# Patient Record
Sex: FEMALE | Race: WHITE | NOT HISPANIC OR LATINO | URBAN - METROPOLITAN AREA
[De-identification: names, ages, dates, MRNs, and addresses within clinical notes are randomized per-mention and may not be internally consistent; named-entity substitution may affect disease eponyms.]

---

## 2020-07-30 ENCOUNTER — OFFICE VISIT (OUTPATIENT)
Dept: URBAN - METROPOLITAN AREA CLINIC 70 | Facility: CLINIC | Age: 50
End: 2020-07-30

## 2020-09-02 ENCOUNTER — OFFICE VISIT (OUTPATIENT)
Dept: URBAN - METROPOLITAN AREA CLINIC 70 | Facility: CLINIC | Age: 50
End: 2020-09-02

## 2020-09-02 ENCOUNTER — LAB OUTSIDE AN ENCOUNTER (OUTPATIENT)
Dept: URBAN - METROPOLITAN AREA CLINIC 70 | Facility: CLINIC | Age: 50
End: 2020-09-02

## 2020-09-02 ENCOUNTER — OFFICE VISIT (OUTPATIENT)
Dept: URBAN - METROPOLITAN AREA CLINIC 70 | Facility: CLINIC | Age: 50
End: 2020-09-02
Payer: COMMERCIAL

## 2020-09-02 DIAGNOSIS — R74.8 ELEVATED LIVER ENZYMES: ICD-10-CM

## 2020-09-02 DIAGNOSIS — K70.10 ALCOHOLIC HEPATITIS, UNSPECIFIED WHETHER ASCITES PRESENT: ICD-10-CM

## 2020-09-02 PROCEDURE — 80074 ACUTE HEPATITIS PANEL: CPT | Performed by: INTERNAL MEDICINE

## 2020-09-02 PROCEDURE — G8427 DOCREV CUR MEDS BY ELIG CLIN: HCPCS | Performed by: INTERNAL MEDICINE

## 2020-09-02 PROCEDURE — G8417 CALC BMI ABV UP PARAM F/U: HCPCS | Performed by: INTERNAL MEDICINE

## 2020-09-02 PROCEDURE — 3017F COLORECTAL CA SCREEN DOC REV: CPT | Performed by: INTERNAL MEDICINE

## 2020-09-02 PROCEDURE — 1036F TOBACCO NON-USER: CPT | Performed by: INTERNAL MEDICINE

## 2020-09-02 PROCEDURE — 99204 OFFICE O/P NEW MOD 45 MIN: CPT | Performed by: INTERNAL MEDICINE

## 2020-09-02 RX ORDER — ZOLPIDEM TARTRATE 10 MG/1
TABLET, FILM COATED ORAL
Qty: 0 | Refills: 0 | Status: ACTIVE | COMMUNITY
Start: 2017-12-05

## 2020-09-02 RX ORDER — TRIAMTERENE AND HYDROCHLOROTHIAZIDE 37.5; 25 MG/1; MG/1
1 TABLET IN THE MORNING TABLET ORAL ONCE A DAY
Status: ACTIVE | COMMUNITY

## 2020-09-02 RX ORDER — FLUTICASONE PROPIONATE 50 UG/1
SPRAY, METERED NASAL
Qty: 0 | Refills: 0 | Status: ACTIVE | COMMUNITY
Start: 1900-01-01

## 2020-09-02 NOTE — HPI-TODAY'S VISIT:
50 year old female with a history of seasonal allergies, prior history of lap band surgery due to obesity, and hepatic steatosis, presents for evaluation of elevated liver enzymes. She reports she underwent lab draw at [facility], and was informed of significant elevation in liver enzymes. These results have been requested and are pending. She reports mild epigastric discomfort which she attributes to lap band displacement 1/10 in intensity, but no RUQ pain, nausea or vomiting. With regard to her history, she does report heavy alcohol use, and consumes 4-5 beers nightly, with a prior history of up to 8 beers daily, does report binge alcohol consumption this weekend, and reports alcohol consumption over 15 years. Denies tobacco, or illicit drug use. Positive for tattoos, no report of blood transfusion before 1992. Prior CT scan noted in 2017 does report hepatic steatosis without overt stigmata of cirrhosis. Reports a remote history of EGD and colonoscopy, no reported FHx of liver disease, no family history of CRC or IBD reported. 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 She presents for further evaluation of above.

## 2020-10-06 LAB
A/G RATIO: 2.1
AAT, DNA ANALYSIS: (no result)
ACTIN (SMOOTH MUSCLE) ANTIBODY: 5
ADDITIONAL INFORMATION:: (no result)
ALBUMIN: 4.2
ALKALINE PHOSPHATASE: 73
ALT (SGPT): 111
ANA DIRECT: POSITIVE
ANTI-MITOCHONDRIAL AB BY IFA: (no result)
AST (SGOT): 68
BASO (ABSOLUTE): 0
BASOS: 0
BILIRUBIN, TOTAL: 0.6
BUN/CREATININE RATIO: 20
BUN: 16
CALCIUM: 9.1
CARBON DIOXIDE, TOTAL: 22
CERULOPLASMIN: 27.5
CHLORIDE: 103
CREATININE: 0.79
EGFR IF AFRICN AM: 100
EGFR IF NONAFRICN AM: 86
EOS (ABSOLUTE): 0.2
EOS: 3
FERRITIN, SERUM: 451
GLOBULIN, TOTAL: 2
GLUCOSE: 109
HBSAG SCREEN: NEGATIVE
HEMATOCRIT: 39.7
HEMATOLOGY COMMENTS:: (no result)
HEMOGLOBIN: 13.4
HEP A AB, IGM: NEGATIVE
HEP B CORE AB, IGM: NEGATIVE
HEP C VIRUS AB: <0.1
IMMATURE CELLS: (no result)
IMMATURE GRANS (ABS): 0
IMMATURE GRANULOCYTES: 0
INR: 1
LYMPHS (ABSOLUTE): 1.8
LYMPHS: 24
Lab: (no result)
MCH: 32.1
MCHC: 33.8
MCV: 95
MONOCYTES(ABSOLUTE): 0.5
MONOCYTES: 7
NEUTROPHILS (ABSOLUTE): 5
NEUTROPHILS: 66
NRBC: (no result)
PLATELETS: 180
POTASSIUM: 4.1
PROTEIN, TOTAL: 6.2
PROTHROMBIN TIME: 10.4
RBC: 4.17
RDW: 12.1
SODIUM: 139
WBC: 7.6

## 2020-10-09 ENCOUNTER — OFFICE VISIT (OUTPATIENT)
Dept: URBAN - METROPOLITAN AREA CLINIC 88 | Facility: CLINIC | Age: 50
End: 2020-10-09

## 2020-11-12 ENCOUNTER — OFFICE VISIT (OUTPATIENT)
Dept: URBAN - METROPOLITAN AREA CLINIC 70 | Facility: CLINIC | Age: 50
End: 2020-11-12
Payer: COMMERCIAL

## 2020-11-12 ENCOUNTER — WEB ENCOUNTER (OUTPATIENT)
Dept: URBAN - METROPOLITAN AREA CLINIC 70 | Facility: CLINIC | Age: 50
End: 2020-11-12

## 2020-11-12 ENCOUNTER — LAB OUTSIDE AN ENCOUNTER (OUTPATIENT)
Dept: URBAN - METROPOLITAN AREA CLINIC 70 | Facility: CLINIC | Age: 50
End: 2020-11-12

## 2020-11-12 DIAGNOSIS — R74.8 ELEVATED LIVER ENZYMES: ICD-10-CM

## 2020-11-12 DIAGNOSIS — K57.92 DIVERTICULITIS: ICD-10-CM

## 2020-11-12 DIAGNOSIS — K70.10 ALCOHOLIC HEPATITIS, UNSPECIFIED WHETHER ASCITES PRESENT: ICD-10-CM

## 2020-11-12 DIAGNOSIS — R10.32 LLQ PAIN: ICD-10-CM

## 2020-11-12 PROBLEM — 307496006: Status: ACTIVE | Noted: 2020-11-12

## 2020-11-12 PROCEDURE — G8427 DOCREV CUR MEDS BY ELIG CLIN: HCPCS | Performed by: INTERNAL MEDICINE

## 2020-11-12 PROCEDURE — 99214 OFFICE O/P EST MOD 30 MIN: CPT | Performed by: INTERNAL MEDICINE

## 2020-11-12 PROCEDURE — 1036F TOBACCO NON-USER: CPT | Performed by: INTERNAL MEDICINE

## 2020-11-12 PROCEDURE — G8417 CALC BMI ABV UP PARAM F/U: HCPCS | Performed by: INTERNAL MEDICINE

## 2020-11-12 PROCEDURE — 3017F COLORECTAL CA SCREEN DOC REV: CPT | Performed by: INTERNAL MEDICINE

## 2020-11-12 RX ORDER — ZOLPIDEM TARTRATE 10 MG/1
TABLET, FILM COATED ORAL
Qty: 0 | Refills: 0 | Status: ACTIVE | COMMUNITY
Start: 2017-12-05

## 2020-11-12 RX ORDER — TRIAMTERENE AND HYDROCHLOROTHIAZIDE 37.5; 25 MG/1; MG/1
1 TABLET IN THE MORNING TABLET ORAL ONCE A DAY
Status: ACTIVE | COMMUNITY

## 2020-11-12 RX ORDER — FLUTICASONE PROPIONATE 50 UG/1
SPRAY, METERED NASAL
Qty: 0 | Refills: 0 | Status: ACTIVE | COMMUNITY
Start: 1900-01-01

## 2020-11-12 NOTE — HPI-TODAY'S VISIT:
50 year old female with a history of seasonal allergies, prior history of lap band surgery due to obesity, and hepatic steatosis, presents for evaluation of elevated liver enzymes. She reports she underwent lab draw at [facility], and was informed of significant elevation in liver enzymes. These results have been requested and are pending. She reports mild epigastric discomfort which she attributes to lap band displacement 1/10 in intensity, but no RUQ pain, nausea or vomiting. With regard to her history, she does report heavy alcohol use, and consumes 4-5 beers nightly, with a prior history of up to 8 beers daily, does report binge alcohol consumption this weekend, and reports alcohol consumption over 15 years. Denies tobacco, or illicit drug use. Positive for tattoos, no report of blood transfusion before 1992. Prior CT scan noted in 2017 does report hepatic steatosis without overt stigmata of cirrhosis. Reports a remote history of EGD and colonoscopy, no reported FHx of liver disease, no family history of CRC or IBD reported. 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 Lab data obtained 9/8 revealed normal hepatitis panel, positive FARHAT however remainder of liver-2 panel was negative.  ALT 68 t-bili 0.6 alk phos 73, Hb 13.2. INR 1.0. US RUQ revealed hepatic steatosis, no evidence of cirrhosis, normal CBD, normal gallbladder. Today on F/U she reports LLQ and L middle quadrant pain and tenderness, as well as general malaise. No fever, no respiratory symptoms, no vomiting. Does report rare irregularity with stools, no anali constipation, no diarrhea, no rectal bleeding.

## 2020-11-13 ENCOUNTER — TELEPHONE ENCOUNTER (OUTPATIENT)
Dept: URBAN - METROPOLITAN AREA CLINIC 92 | Facility: CLINIC | Age: 50
End: 2020-11-13

## 2020-11-13 PROBLEM — 10743008: Status: ACTIVE | Noted: 2020-11-13

## 2020-11-13 RX ORDER — TRIAMTERENE AND HYDROCHLOROTHIAZIDE 37.5; 25 MG/1; MG/1
1 TABLET IN THE MORNING TABLET ORAL ONCE A DAY
Status: ACTIVE | COMMUNITY

## 2020-11-13 RX ORDER — DICYCLOMINE HYDROCHLORIDE 10 MG/1
1 TABLET CAPSULE ORAL
Qty: 30 | Refills: 1 | OUTPATIENT
Start: 2020-11-13 | End: 2020-12-03

## 2020-11-13 RX ORDER — ZOLPIDEM TARTRATE 10 MG/1
TABLET, FILM COATED ORAL
Qty: 0 | Refills: 0 | Status: ACTIVE | COMMUNITY
Start: 2017-12-05

## 2020-11-13 RX ORDER — FLUTICASONE PROPIONATE 50 UG/1
SPRAY, METERED NASAL
Qty: 0 | Refills: 0 | Status: ACTIVE | COMMUNITY
Start: 1900-01-01

## 2020-11-19 ENCOUNTER — LAB OUTSIDE AN ENCOUNTER (OUTPATIENT)
Dept: URBAN - METROPOLITAN AREA CLINIC 70 | Facility: CLINIC | Age: 50
End: 2020-11-19

## 2020-11-19 ENCOUNTER — CLAIMS CREATED FROM THE CLAIM WINDOW (OUTPATIENT)
Dept: URBAN - METROPOLITAN AREA CLINIC 70 | Facility: CLINIC | Age: 50
End: 2020-11-19
Payer: COMMERCIAL

## 2020-11-19 DIAGNOSIS — R10.12 LUQ ABDOMINAL PAIN: ICD-10-CM

## 2020-11-19 DIAGNOSIS — R74.01 TRANSAMINITIS: ICD-10-CM

## 2020-11-19 DIAGNOSIS — Z12.11 SCREENING COLONOSCOPY: ICD-10-CM

## 2020-11-19 DIAGNOSIS — K76.0 FATTY LIVER: ICD-10-CM

## 2020-11-19 PROBLEM — 301715003 LEFT UPPER QUADRANT PAIN: Status: ACTIVE | Noted: 2020-11-19

## 2020-11-19 PROBLEM — 444783004 SCREENING COLONOSCOPY: Status: ACTIVE | Noted: 2020-11-19

## 2020-11-19 PROCEDURE — 3017F COLORECTAL CA SCREEN DOC REV: CPT | Performed by: INTERNAL MEDICINE

## 2020-11-19 PROCEDURE — 99214 OFFICE O/P EST MOD 30 MIN: CPT | Performed by: INTERNAL MEDICINE

## 2020-11-19 PROCEDURE — 99204 OFFICE O/P NEW MOD 45 MIN: CPT | Performed by: INTERNAL MEDICINE

## 2020-11-19 PROCEDURE — G8417 CALC BMI ABV UP PARAM F/U: HCPCS | Performed by: INTERNAL MEDICINE

## 2020-11-19 PROCEDURE — G8427 DOCREV CUR MEDS BY ELIG CLIN: HCPCS | Performed by: INTERNAL MEDICINE

## 2020-11-19 PROCEDURE — 1036F TOBACCO NON-USER: CPT | Performed by: INTERNAL MEDICINE

## 2020-11-19 PROCEDURE — G9903 PT SCRN TBCO ID AS NON USER: HCPCS | Performed by: INTERNAL MEDICINE

## 2020-11-19 RX ORDER — ZOLPIDEM TARTRATE 10 MG/1
TABLET, FILM COATED ORAL
Qty: 0 | Refills: 0 | Status: ACTIVE | COMMUNITY
Start: 2017-12-05

## 2020-11-19 RX ORDER — FLUTICASONE PROPIONATE 50 UG/1
SPRAY, METERED NASAL
Qty: 0 | Refills: 0 | Status: ACTIVE | COMMUNITY
Start: 1900-01-01

## 2020-11-19 RX ORDER — TRIAMTERENE AND HYDROCHLOROTHIAZIDE 37.5; 25 MG/1; MG/1
1 TABLET IN THE MORNING TABLET ORAL ONCE A DAY
Status: ACTIVE | COMMUNITY

## 2020-11-19 RX ORDER — DICYCLOMINE HYDROCHLORIDE 10 MG/1
1 TABLET CAPSULE ORAL
Qty: 30 | Refills: 1 | Status: ACTIVE | COMMUNITY
Start: 2020-11-13 | End: 2020-12-03

## 2020-11-19 NOTE — HPI-TODAY'S VISIT:
Pt presents for a F/U visit regarding labs and previous visit as stated below.  Repeat labs on 11/12/20 found increase in AST to 165 from 111 and ALT to 186 from 68.  Remainder of LFT's remains wnl.  Lipase and CBC wnl. CT A/P on 11/12/20 found fatty liver, but otherwise unremarkable. Today she reports ongoing pain to LUQ.  Feels as if stomach gets pushed up after eating.  States she is in process of following up with surgeon to eval lap band for possible revision. Denies any new GI concerns. States she has decreased her alcohol use.  LOV:  50 year old female with a history of seasonal allergies, prior history of lap band surgery due to obesity, and hepatic steatosis, presents for evaluation of elevated liver enzymes. She reports she underwent lab draw at [facility], and was informed of significant elevation in liver enzymes. These results have been requested and are pending. She reports mild epigastric discomfort which she attributes to lap band displacement 1/10 in intensity, but no RUQ pain, nausea or vomiting. With regard to her history, she does report heavy alcohol use, and consumes 4-5 beers nightly, with a prior history of up to 8 beers daily, does report binge alcohol consumption this weekend, and reports alcohol consumption over 15 years. Denies tobacco, or illicit drug use. Positive for tattoos, no report of blood transfusion before 1992. Prior CT scan noted in 2017 does report hepatic steatosis without overt stigmata of cirrhosis. Reports a remote history of EGD and colonoscopy, no reported FHx of liver disease, no family history of CRC or IBD reported. 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 Lab data obtained 9/8 revealed normal hepatitis panel, positive FARHAT however remainder of liver-2 panel was negative.  ALT 68 t-bili 0.6 alk phos 73, Hb 13.2. INR 1.0. US RUQ revealed hepatic steatosis, no evidence of cirrhosis, normal CBD, normal gallbladder. Today on F/U she reports LLQ and L middle quadrant pain and tenderness, as well as general malaise. No fever, no respiratory symptoms, no vomiting. Does report rare irregularity with stools, no anali constipation, no diarrhea, no rectal bleeding.

## 2021-03-16 ENCOUNTER — OFFICE VISIT (OUTPATIENT)
Dept: URBAN - METROPOLITAN AREA MEDICAL CENTER 42 | Facility: MEDICAL CENTER | Age: 51
End: 2021-03-16
Payer: COMMERCIAL

## 2021-03-16 DIAGNOSIS — K20.80 ESOPHAGITIS, LOS ANGELES GRADE B: ICD-10-CM

## 2021-03-16 DIAGNOSIS — D12.5 ADENOMA OF SIGMOID COLON: ICD-10-CM

## 2021-03-16 DIAGNOSIS — Z12.11 COLON CANCER SCREENING: ICD-10-CM

## 2021-03-16 DIAGNOSIS — K29.60 ADENOPAPILLOMATOSIS GASTRICA: ICD-10-CM

## 2021-03-16 PROCEDURE — 43239 EGD BIOPSY SINGLE/MULTIPLE: CPT | Performed by: INTERNAL MEDICINE

## 2021-03-16 PROCEDURE — 45385 COLONOSCOPY W/LESION REMOVAL: CPT | Performed by: INTERNAL MEDICINE

## 2021-03-16 RX ORDER — ZOLPIDEM TARTRATE 10 MG/1
TABLET, FILM COATED ORAL
Qty: 0 | Refills: 0 | Status: ACTIVE | COMMUNITY
Start: 2017-12-05

## 2021-03-16 RX ORDER — FLUTICASONE PROPIONATE 50 UG/1
SPRAY, METERED NASAL
Qty: 0 | Refills: 0 | Status: ACTIVE | COMMUNITY
Start: 1900-01-01

## 2021-03-16 RX ORDER — TRIAMTERENE AND HYDROCHLOROTHIAZIDE 37.5; 25 MG/1; MG/1
1 TABLET IN THE MORNING TABLET ORAL ONCE A DAY
Status: ACTIVE | COMMUNITY

## 2021-04-29 ENCOUNTER — TELEPHONE ENCOUNTER (OUTPATIENT)
Dept: URBAN - METROPOLITAN AREA CLINIC 70 | Facility: CLINIC | Age: 51
End: 2021-04-29

## 2021-05-30 ENCOUNTER — ERX REFILL RESPONSE (OUTPATIENT)
Dept: URBAN - METROPOLITAN AREA CLINIC 70 | Facility: CLINIC | Age: 51
End: 2021-05-30

## 2021-05-30 RX ORDER — DICYCLOMINE HYDROCHLORIDE 10 MG/1
1 TABLET CAPSULE ORAL
Qty: 30 | Refills: 0

## 2021-08-18 ENCOUNTER — TELEPHONE ENCOUNTER (OUTPATIENT)
Dept: URBAN - METROPOLITAN AREA CLINIC 70 | Facility: CLINIC | Age: 51
End: 2021-08-18

## 2021-08-18 ENCOUNTER — LAB OUTSIDE AN ENCOUNTER (OUTPATIENT)
Dept: URBAN - METROPOLITAN AREA CLINIC 70 | Facility: CLINIC | Age: 51
End: 2021-08-18

## 2021-08-18 ENCOUNTER — OFFICE VISIT (OUTPATIENT)
Dept: URBAN - METROPOLITAN AREA CLINIC 70 | Facility: CLINIC | Age: 51
End: 2021-08-18
Payer: COMMERCIAL

## 2021-08-18 VITALS — WEIGHT: 226 LBS | BODY MASS INDEX: 41.59 KG/M2 | HEIGHT: 62 IN

## 2021-08-18 DIAGNOSIS — R74.01 TRANSAMINITIS: ICD-10-CM

## 2021-08-18 DIAGNOSIS — K76.0 STEATOSIS OF LIVER: ICD-10-CM

## 2021-08-18 PROBLEM — 197321007: Status: ACTIVE | Noted: 2021-08-18

## 2021-08-18 PROCEDURE — 99213 OFFICE O/P EST LOW 20 MIN: CPT | Performed by: NURSE PRACTITIONER

## 2021-08-18 RX ORDER — DICYCLOMINE HYDROCHLORIDE 10 MG/1
1 TABLET CAPSULE ORAL
Qty: 30 | Refills: 0 | Status: ACTIVE | COMMUNITY

## 2021-08-18 RX ORDER — TRIAMTERENE AND HYDROCHLOROTHIAZIDE 37.5; 25 MG/1; MG/1
1 TABLET IN THE MORNING TABLET ORAL ONCE A DAY
Status: ACTIVE | COMMUNITY

## 2021-08-18 RX ORDER — CARVEDILOL 12.5 MG/1
1 TABLET WITH FOOD TABLET, FILM COATED ORAL TWICE A DAY
Status: ACTIVE | COMMUNITY

## 2021-08-18 RX ORDER — FLUTICASONE PROPIONATE 50 UG/1
SPRAY, METERED NASAL
Qty: 0 | Refills: 0 | Status: ACTIVE | COMMUNITY
Start: 1900-01-01

## 2021-08-18 RX ORDER — ZOLPIDEM TARTRATE 10 MG/1
TABLET, FILM COATED ORAL
Qty: 0 | Refills: 0 | Status: ACTIVE | COMMUNITY
Start: 2017-12-05

## 2021-08-18 NOTE — HPI-TODAY'S VISIT:
Pt presents today for a GI clearance for bariatric surgery. Pt states she is to have replacement of lap band. AST and ALT continue to be elevated.  As of 7/21/21 labs at [facility],  and . T bili 1 and AP 65.  Platelets 183. CT A/P 11/12/20 showed fatty liver.  Question of whether cirrhosis present and next step was possible Fibroscan or liver biopsy. Pt does have a beer each evening.

## 2021-08-31 ENCOUNTER — OFFICE VISIT (OUTPATIENT)
Dept: URBAN - METROPOLITAN AREA CLINIC 70 | Facility: CLINIC | Age: 51
End: 2021-08-31
Payer: COMMERCIAL

## 2021-08-31 ENCOUNTER — DASHBOARD ENCOUNTERS (OUTPATIENT)
Age: 51
End: 2021-08-31

## 2021-08-31 DIAGNOSIS — K76.0 FATTY LIVER: ICD-10-CM

## 2021-08-31 DIAGNOSIS — R74.01 TRANSAMINITIS: ICD-10-CM

## 2021-08-31 PROCEDURE — 99214 OFFICE O/P EST MOD 30 MIN: CPT | Performed by: INTERNAL MEDICINE

## 2021-08-31 RX ORDER — DICYCLOMINE HYDROCHLORIDE 10 MG/1
1 TABLET CAPSULE ORAL
Qty: 30 | Refills: 0 | Status: ACTIVE | COMMUNITY

## 2021-08-31 RX ORDER — ZOLPIDEM TARTRATE 10 MG/1
TABLET, FILM COATED ORAL
Qty: 0 | Refills: 0 | Status: ACTIVE | COMMUNITY
Start: 2017-12-05

## 2021-08-31 RX ORDER — TRIAMTERENE AND HYDROCHLOROTHIAZIDE 37.5; 25 MG/1; MG/1
1 TABLET IN THE MORNING TABLET ORAL ONCE A DAY
Status: ACTIVE | COMMUNITY

## 2021-08-31 RX ORDER — CARVEDILOL 12.5 MG/1
1 TABLET WITH FOOD TABLET, FILM COATED ORAL TWICE A DAY
Status: ACTIVE | COMMUNITY

## 2021-08-31 RX ORDER — FLUTICASONE PROPIONATE 50 UG/1
SPRAY, METERED NASAL
Qty: 0 | Refills: 0 | Status: ACTIVE | COMMUNITY
Start: 1900-01-01

## 2021-08-31 NOTE — HPI-TODAY'S VISIT:
OV 08/18/21: Pt presents today for a GI clearance for bariatric surgery.. AST and ALT continue to be elevated.  As of 7/21/21 labs at [facility],  and . T bili 1 and AP 65. Platelets 183. CT A/P 11/12/20 showed fatty liver.  Question of whether cirrhosis present and next step was possible Fibroscan or liver biopsy. Pt does have a beer each evening.  Today 08/31/21: Patient presents today for follow up after undergoing labs and fibroscan to r/o cirrhosis. Labs on 09/8/21 showed AST 68,  but plt and INR WNL. Viral and autoimmune hepatitis negative. Fibroscan showed mild-moderate fibrosis with Metavir score F2-F3. Labs and imaging with no evidence of cirrhosis. Patient currently w/o concerns. She has some chronic abdominal discomfort and difficulty swallowing 2/2 malfunctioning lap band and is pending bariatic surgery with removal of lap band along wiht gastric bypass by Dr Jayce Lino Jr.

## 2021-09-01 ENCOUNTER — TELEPHONE ENCOUNTER (OUTPATIENT)
Dept: URBAN - METROPOLITAN AREA CLINIC 70 | Facility: CLINIC | Age: 51
End: 2021-09-01